# Patient Record
Sex: MALE | Race: WHITE | NOT HISPANIC OR LATINO | URBAN - METROPOLITAN AREA
[De-identification: names, ages, dates, MRNs, and addresses within clinical notes are randomized per-mention and may not be internally consistent; named-entity substitution may affect disease eponyms.]

---

## 2020-12-20 ENCOUNTER — EMERGENCY (EMERGENCY)
Facility: HOSPITAL | Age: 40
LOS: 1 days | Discharge: HOME | End: 2020-12-20
Attending: EMERGENCY MEDICINE
Payer: COMMERCIAL

## 2020-12-20 VITALS
WEIGHT: 225.09 LBS | SYSTOLIC BLOOD PRESSURE: 172 MMHG | TEMPERATURE: 98 F | DIASTOLIC BLOOD PRESSURE: 102 MMHG | RESPIRATION RATE: 18 BRPM | OXYGEN SATURATION: 100 % | HEIGHT: 73 IN | HEART RATE: 70 BPM

## 2020-12-20 LAB
ALBUMIN SERPL ELPH-MCNC: 3.9 G/DL — SIGNIFICANT CHANGE UP (ref 3.3–5.2)
ALP SERPL-CCNC: 57 U/L — SIGNIFICANT CHANGE UP (ref 40–120)
ALT FLD-CCNC: 19 U/L — SIGNIFICANT CHANGE UP
ANION GAP SERPL CALC-SCNC: 8 MMOL/L — SIGNIFICANT CHANGE UP (ref 5–17)
APTT BLD: 34.2 SEC — SIGNIFICANT CHANGE UP (ref 27.5–35.5)
AST SERPL-CCNC: 23 U/L — SIGNIFICANT CHANGE UP
BASOPHILS # BLD AUTO: 0.02 K/UL — SIGNIFICANT CHANGE UP (ref 0–0.2)
BASOPHILS NFR BLD AUTO: 0.3 % — SIGNIFICANT CHANGE UP (ref 0–2)
BILIRUB SERPL-MCNC: 0.3 MG/DL — LOW (ref 0.4–2)
BUN SERPL-MCNC: 18 MG/DL — SIGNIFICANT CHANGE UP (ref 8–20)
CALCIUM SERPL-MCNC: 9.1 MG/DL — SIGNIFICANT CHANGE UP (ref 8.6–10.2)
CHLORIDE SERPL-SCNC: 105 MMOL/L — SIGNIFICANT CHANGE UP (ref 98–107)
CO2 SERPL-SCNC: 29 MMOL/L — SIGNIFICANT CHANGE UP (ref 22–29)
CREAT SERPL-MCNC: 0.9 MG/DL — SIGNIFICANT CHANGE UP (ref 0.5–1.3)
EOSINOPHIL # BLD AUTO: 0.08 K/UL — SIGNIFICANT CHANGE UP (ref 0–0.5)
EOSINOPHIL NFR BLD AUTO: 1.1 % — SIGNIFICANT CHANGE UP (ref 0–6)
GLUCOSE SERPL-MCNC: 106 MG/DL — HIGH (ref 70–99)
HCT VFR BLD CALC: 38.6 % — LOW (ref 39–50)
HGB BLD-MCNC: 13.1 G/DL — SIGNIFICANT CHANGE UP (ref 13–17)
IMM GRANULOCYTES NFR BLD AUTO: 0.4 % — SIGNIFICANT CHANGE UP (ref 0–1.5)
INR BLD: 1.25 RATIO — HIGH (ref 0.88–1.16)
LIDOCAIN IGE QN: 49 U/L — SIGNIFICANT CHANGE UP (ref 22–51)
LYMPHOCYTES # BLD AUTO: 2.34 K/UL — SIGNIFICANT CHANGE UP (ref 1–3.3)
LYMPHOCYTES # BLD AUTO: 32.8 % — SIGNIFICANT CHANGE UP (ref 13–44)
MAGNESIUM SERPL-MCNC: 2 MG/DL — SIGNIFICANT CHANGE UP (ref 1.6–2.6)
MCHC RBC-ENTMCNC: 32.2 PG — SIGNIFICANT CHANGE UP (ref 27–34)
MCHC RBC-ENTMCNC: 33.9 GM/DL — SIGNIFICANT CHANGE UP (ref 32–36)
MCV RBC AUTO: 94.8 FL — SIGNIFICANT CHANGE UP (ref 80–100)
MONOCYTES # BLD AUTO: 1.15 K/UL — HIGH (ref 0–0.9)
MONOCYTES NFR BLD AUTO: 16.1 % — HIGH (ref 2–14)
NEUTROPHILS # BLD AUTO: 3.51 K/UL — SIGNIFICANT CHANGE UP (ref 1.8–7.4)
NEUTROPHILS NFR BLD AUTO: 49.3 % — SIGNIFICANT CHANGE UP (ref 43–77)
PLATELET # BLD AUTO: 199 K/UL — SIGNIFICANT CHANGE UP (ref 150–400)
POTASSIUM SERPL-MCNC: 4.4 MMOL/L — SIGNIFICANT CHANGE UP (ref 3.5–5.3)
POTASSIUM SERPL-SCNC: 4.4 MMOL/L — SIGNIFICANT CHANGE UP (ref 3.5–5.3)
PROT SERPL-MCNC: 6.5 G/DL — LOW (ref 6.6–8.7)
PROTHROM AB SERPL-ACNC: 14.4 SEC — HIGH (ref 10.6–13.6)
RBC # BLD: 4.07 M/UL — LOW (ref 4.2–5.8)
RBC # FLD: 12.2 % — SIGNIFICANT CHANGE UP (ref 10.3–14.5)
SODIUM SERPL-SCNC: 142 MMOL/L — SIGNIFICANT CHANGE UP (ref 135–145)
TROPONIN T SERPL-MCNC: <0.01 NG/ML — SIGNIFICANT CHANGE UP (ref 0–0.06)
WBC # BLD: 7.13 K/UL — SIGNIFICANT CHANGE UP (ref 3.8–10.5)
WBC # FLD AUTO: 7.13 K/UL — SIGNIFICANT CHANGE UP (ref 3.8–10.5)

## 2020-12-20 NOTE — ED PROVIDER NOTE - CLINICAL SUMMARY MEDICAL DECISION MAKING FREE TEXT BOX
A 40 year old male pt with a hx of Schizoaffective disorder, Bipolar I disorder, HTN, HLD, presents to the ED c/o chest tightness. A 40 year old male pt with a hx of Schizoaffective disorder, Bipolar I disorder, HTN, HLD, presents to the ED c/o chest tightness. Pt with trop negative, ekg likely early repol, but due to limited history from patient due to baseline psych issues, unable to fully assess how cardiac the chest pain was, also from paperwork pt has been non compliant with htn/hld meds, will keep in obs for serial trops/ekg and cards consult,

## 2020-12-20 NOTE — ED ADULT TRIAGE NOTE - CHIEF COMPLAINT QUOTE
From [facility] state with aide c/o chest tightness X 1 hour.   EKG done at [facility] showed "possible ST elevation to V1 to V4."  Charge RN Huy received call from [place] doctor Jamari prior to arrival to inform us pt has history of "playing opposum."  Pt arrives not answering questions, not following commands.  Pt moving arms, able to hold arm up and blinks his eyes.  Pupils equal and responsive. respirations even and unlabored, skin color normal.  [facility] administered 324mg ASA at 2130. From [facility] state with aide c/o chest tightness X 1 hour.   EKG done at [facility] showed "possible ST elevation V1 to V4."  Charge RN Huy received call from [place] doctor Jamari prior to arrival to inform us pt has history of "playing opposum."  Pt arrives not answering questions, not following commands.  Pt moving arms, able to hold arm up and blinks his eyes.  Pupils equal and responsive. respirations even and unlabored, skin color normal.  [place] administered 324mg ASA at 2130.

## 2020-12-20 NOTE — ED PROVIDER NOTE - NEUROLOGICAL, MLM
Pt refusing to open eyes or answer questions. As per report given to nurse by [facility] staff, pt likes to "play possum"

## 2020-12-20 NOTE — ED PROVIDER NOTE - PMH
Schizoaffective disorder     HLD (hyperlipidemia)    HTN (hypertension)    Schizoaffective disorder

## 2020-12-20 NOTE — ED PROVIDER NOTE - OBJECTIVE STATEMENT
A 40 year old male pt with a hx of Schizoaffective disorder, Bipolar disorder, HLD, HTN, on Depakote, Clozapine, Haldol, presents to the ED from [facility] c/o chest tightness. As per report from [facility], pt began complaining of substernal, non-radiating chest tightness 1 hour PTA. Pt was given 325 mg Aspirin PTA and was sent to ED via ambulance. As per [facility] staff, pt has hx of not being cooperative/responsive to staff in ED. In ED, pt not answering questions, staff with patient does not know his hx as he does not normally work with him. [facility] staff paperwork denies fevers/chills, ha, loc, focal neuro deficits, sob/palp, cough, abd pain/n/v/d, urinary symptoms, recent travel and sick contacts. No further hx available at this time from pt. A 40 year old male pt with a hx of Schizoaffective disorder, Bipolar disorder, HLD, HTN, on Depakote, Clozapine, Haldol, presents to the ED from [facility] c/o chest tightness. As per report from [facility], pt began complaining of substernal, non-radiating chest tightness 1 hour PTA. Pt was given 325 mg Aspirin PTA and was sent to ED via ambulance. As per [facility] staff, pt has hx of not being cooperative/responsive to staff in ED. In ED, pt not answering questions, staff with patient does not know his hx as he does not normally work with him. [facility] staff paperwork denies fevers/chills, ha, loc, focal neuro deficits, sob/palp, cough, abd pain/n/v/d, urinary symptoms, recent travel and sick contacts. No further hx available at this time from pt. Paperwork states pt is non compliant with HTN and HLD meds

## 2020-12-21 VITALS
RESPIRATION RATE: 19 BRPM | OXYGEN SATURATION: 96 % | HEART RATE: 83 BPM | DIASTOLIC BLOOD PRESSURE: 91 MMHG | TEMPERATURE: 98 F | SYSTOLIC BLOOD PRESSURE: 132 MMHG

## 2020-12-21 DIAGNOSIS — F31.9 BIPOLAR DISORDER, UNSPECIFIED: ICD-10-CM

## 2020-12-21 DIAGNOSIS — F25.9 SCHIZOAFFECTIVE DISORDER, UNSPECIFIED: ICD-10-CM

## 2020-12-21 LAB
SARS-COV-2 RNA SPEC QL NAA+PROBE: SIGNIFICANT CHANGE UP
TROPONIN T SERPL-MCNC: <0.01 NG/ML — SIGNIFICANT CHANGE UP (ref 0–0.06)
TROPONIN T SERPL-MCNC: <0.01 NG/ML — SIGNIFICANT CHANGE UP (ref 0–0.06)

## 2020-12-21 RX ORDER — METOPROLOL TARTRATE 50 MG
25 TABLET ORAL DAILY
Refills: 0 | Status: DISCONTINUED | OUTPATIENT
Start: 2020-12-21 | End: 2020-12-25

## 2020-12-21 RX ORDER — IBUPROFEN 200 MG
400 TABLET ORAL ONCE
Refills: 0 | Status: DISCONTINUED | OUTPATIENT
Start: 2020-12-21 | End: 2020-12-25

## 2020-12-21 RX ORDER — ASPIRIN/CALCIUM CARB/MAGNESIUM 324 MG
81 TABLET ORAL DAILY
Refills: 0 | Status: DISCONTINUED | OUTPATIENT
Start: 2020-12-21 | End: 2020-12-25

## 2020-12-21 RX ORDER — DIVALPROEX SODIUM 500 MG/1
250 TABLET, DELAYED RELEASE ORAL DAILY
Refills: 0 | Status: DISCONTINUED | OUTPATIENT
Start: 2020-12-21 | End: 2020-12-25

## 2020-12-21 RX ORDER — OLANZAPINE 15 MG/1
15 TABLET, FILM COATED ORAL DAILY
Refills: 0 | Status: DISCONTINUED | OUTPATIENT
Start: 2020-12-21 | End: 2020-12-25

## 2020-12-21 RX ADMIN — Medication 81 MILLIGRAM(S): at 15:08

## 2020-12-21 RX ADMIN — Medication 25 MILLIGRAM(S): at 09:38

## 2020-12-21 NOTE — ED BEHAVIORAL HEALTH ASSESSMENT NOTE - OTHER
Unable to assess Unable to assess, patient seen in bed [facility] Unable to asses chart

## 2020-12-21 NOTE — ED ADULT NURSE NOTE - NSIMPLEMENTINTERV_GEN_ALL_ED
Implemented All Fall Risk Interventions:  [place] to call system. Call bell, personal items and telephone within reach. Instruct patient to call for assistance. Room bathroom lighting operational. Non-slip footwear when patient is off stretcher. Physically safe environment: no spills, clutter or unnecessary equipment. Stretcher in lowest position, wheels locked, appropriate side rails in place. Provide visual cue, wrist band, yellow gown, etc. Monitor gait and stability. Monitor for mental status changes and reorient to person, place, and time. Review medications for side effects contributing to fall risk. Reinforce activity limits and safety measures with patient and family.

## 2020-12-21 NOTE — ED CDU PROVIDER DISPOSITION NOTE - PATIENT PORTAL LINK FT
You can access the FollowMyHealth Patient Portal offered by [facility] by registering at the following website: [email or web address]. By joining [facility]’s FollowMyHealth portal, you will also be able to view your health information using other applications (apps) compatible with our system.

## 2020-12-21 NOTE — ED ADULT NURSE NOTE - OBJECTIVE STATEMENT
Patient has been having chest tightness for 1 hour prior to arrival, patient no answer questions or responding to nurse however being cooperative and allowing tests to be done. [place] aide at bedside.

## 2020-12-21 NOTE — ED CDU PROVIDER INITIAL DAY NOTE - DETAILS
40m pw with episode of substernal chest pain x 1 hour PTA from Health system. Pt uncooperative upon arrival. Pt placed in CDU for serial trops / EKGs and SS cards consult.

## 2020-12-21 NOTE — ED CDU PROVIDER INITIAL DAY NOTE - MEDICAL DECISION MAKING DETAILS
40m pw with episode of substernal chest pain x 1 hour PTA from [facility]. Pt uncooperative upon arrival. As per [facility] staff, pt regularly refuses to cooperate during ED visits. CT head ordered to assess for acute pathology. Pt placed in CDU for serial trops / EKGs and SS cards consult.

## 2020-12-21 NOTE — ED CDU PROVIDER INITIAL DAY NOTE - OBJECTIVE STATEMENT
40 year old male pt with a hx of Schizoaffective disorder, Bipolar disorder, HLD, HTN, on Depakote, Clozapine, Haldol, presents to the ED from [facility] c/o chest tightness. As per report from [place], pt began complaining of substernal, non-radiating chest tightness 1 hour PTA. Pt was given 325 mg Aspirin PTA and was sent to ED via ambulance. As per [facility] staff, pt has hx of not being cooperative/responsive to staff in ED. In ED, pt not answering questions, staff with patient does not know his hx as he does not normally work with him. [facility] staff paperwork denies fevers/chills, ha, loc, focal neuro deficits, sob/palp, cough, abd pain/n/v/d, urinary symptoms, recent travel and sick contacts. No further hx available at this time from pt. Paperwork states pt is non compliant with HTN and HLD meds

## 2020-12-21 NOTE — ED ADULT NURSE NOTE - CHIEF COMPLAINT
Addended by: ERNESTINE ADAM on: 3/15/2018 04:38 PM     Modules accepted: Orders    
The patient is a 40y Male complaining of chest pain.

## 2020-12-21 NOTE — ED CDU PROVIDER DISPOSITION NOTE - CLINICAL COURSE
Patient Is a 40 year old male who presented from [facility] c/o chest pain. patient originally would not respond to staff, now is walking and talking with no complaints of chest pain. patient had negative serial troponins.  patient will be discharged back to [facility] with cardiology follow up

## 2020-12-21 NOTE — ED CDU PROVIDER DISPOSITION NOTE - NSFOLLOWUPINSTRUCTIONS_ED_ALL_ED_FT
- Please follow up with your Primary Care Doctor in 24-48 hr.  - Seek immediate medical care for any new, worsening or concerning signs or symptoms.   - Take medications as directed, be sure to read all instructions on packaging  - You were given copies of all your test results, please bring to your primary care doctor for review of any abnormal test results  - Follow up with Cardiology in 1 week    Feel better!

## 2020-12-21 NOTE — ED CDU PROVIDER DISPOSITION NOTE - ATTENDING CONTRIBUTION TO CARE
Placed on observation for evaluation of chest pain.  Serial ECG unremarkable, trop x3 negative.  COVID negative.

## 2020-12-21 NOTE — ED BEHAVIORAL HEALTH ASSESSMENT NOTE - DETAILS
Unable to assess, patient is non-verbal ED staff Patient non-verbal and unable to assess [facility] nursing office

## 2020-12-21 NOTE — ED CDU PROVIDER INITIAL DAY NOTE - PROGRESS NOTE DETAILS
Patient is a 40 year old male from [facility] who was seen by Dr. Alamo and myself on AM rounds. patient laying in bed, will not speak, attempted to give HTN meds which patient would not take. Spoke with psychiatry at [facility] who states patient does this when in hospital and that he was taken off BP meds.  No pedal edema, S1S2, lungs CTA.  Serial trops negative.  Will discharge back to [facility] Patient now speaking, c/o dizziness.    will give BP meds, pending covid. case d/w dr durand at [facility]: pt with h/o htn, refusing medication treatmetn and has had blood pressure in the normal ranges with last BP on 12/10 docuemted at 135/87.  Informed dr durand about chest pain work-up.  awaiting covid testing for return. patient now walking around CDU, requesting a second covid swab stating that he thinks he has now come down with covid.  Was complaining of right foot burning earlier, now ambulatory with normal gait.  Explained to patient that we are awaiting results of covid swab that was obtain at 0830 today.

## 2020-12-21 NOTE — ED BEHAVIORAL HEALTH ASSESSMENT NOTE - HPI (INCLUDE ILLNESS QUALITY, SEVERITY, DURATION, TIMING, CONTEXT, MODIFYING FACTORS, ASSOCIATED SIGNS AND SYMPTOMS)
A 39 y/o male with PMH of schizoaffective disorder, bipolar disorder, HLD and HTN. He is currently on Depakote, Clozapine and Haldol. The patient presented to the ED from [facility] c/o chest tightness, non-radiating for 1 hour. When the patient was addressed this morning he was did not respond to commands and as per ED staff he has been non-verbal since he has been in the ED. A 39 y/o male with PMH of schizoaffective disorder, , HLD and HTN. He is currently on Depakote, Clozapine and Haldol. The patient presented to the ED from [facility] c/o chest tightness, non-radiating for 1 hour. When the patient was addressed this morning he was did not respond to commands and as per ED staff he has been non-verbal since he has been in the ED.

## 2020-12-21 NOTE — CHART NOTE - NSCHARTNOTEFT_GEN_A_CORE
SOCIAL WORK NOTE:  THIS WORKER REVIEWED CASE. PATIENT IS FROM [facility] [address] WITH DX OF SCHIZOAFFECTIVE DISORDER AND BIPOLAR.  SOCIAL WORK WILL CONTINUE TO FOLLOW FOR RETURN BACK TO [facility] WHEN PATIENT IS MEDICALLY STABLE.  MD TO MD REPORT MUST BE CALLED IN PRIOR TO RETURN TO [facility] NURSING OFFICE # [phone] AND WILL FAX CLINICALS TO FAX# [phone].
SOCIAL  WORK NOTE:  COVID RESULT CAME BACK AND ARE NEGATIVE. ALL CLINCALS FAXED TO [facility] NURSING OFFICE FAX # [phone] AND AMBULANCE ARRANGED FOR ASAP PICKUP BACK TO [facility].  SPOKE WITH NURSING OFFICE AND THEY ARE AWARE.

## 2020-12-21 NOTE — ED BEHAVIORAL HEALTH ASSESSMENT NOTE - RISK ASSESSMENT
Unable to determine Suicide Risk Impaired ability to make decisions due to his regressed mental state

## 2020-12-21 NOTE — CONSULT NOTE ADULT - SUBJECTIVE AND OBJECTIVE BOX
[facility]                                                               Office:  [address] [address]                                                              Telephone: [phone]. Fax:[phone]                                                                        CARDIOLOGY CONSULTATION NOTE                                                                                             Consult requested by:    Reason for Consultation:   History obtained by: Patient and medical record   obtained: No  Cardiologist:    Chief complaint:    Patient is a 40y old  Male who presents with a chief complaint of       HPI:        REVIEW OF SYMPTOMS:     CONSTITUTIONAL: No fever, no weight loss, no fatigue, no weight gain   ENMT:  No difficulty hearing, tinnitus, vertigo; No sinus or throat pain  NECK: No pain or stiffness  CARDIOVASCULAR: No chest pain, no dyspnea, no syncope, no palpitations, no dizziness, no Orthopnea, no Paroxsymal nocturnal dyspnea  RESPIRATORY: No Dyspnea on exertion, no Shortness of breath, no cough, no wheezing  : No dysuria, no hematuria   GI: No dark color stool, no melena, no diarrhea, no constipation, no abdominal pain   NEURO: No headache, no dizziness, no slurred speech   MUSCULOSKELETAL: No joint pain or swelling; No muscle, back, or extremity pain  PSYCH: No agitation, no anxiety.    ALL OTHER REVIEW OF SYSTEMS ARE NEGATIVE.      PREVIOUS DIAGNOSTIC TESTING  ECHO FINDINGS:      STRESS FINDINGS:      CATHETERIZATION FINDINGS:         ALLERGIES: Allergies    No Known Allergies    Intolerances          PAST MEDICAL HISTORY  Schizoaffective disorder    HLD (hyperlipidemia)    HTN (hypertension)        PAST SURGICAL HISTORY      FAMILY HISTORY:  No pertinent family history in first degree relatives        SOCIAL HISTORY:  Denies smoking/alcohol/drugs  CIGARETTES:     ALCOHOL:  DRUGS:      CURRENT MEDICATIONS:  metoprolol succinate ER 25 milliGRAM(s) Oral daily     diVALproex ER  OLANZapine  aspirin enteric coated        HOME MEDICATIONS:      Vital Signs Last 24 Hrs  T(C): 36.5 (21 Dec 2020 07:56), Max: 36.6 (20 Dec 2020 22:12)  T(F): 97.7 (21 Dec 2020 07:56), Max: 97.9 (20 Dec 2020 22:12)  HR: 61 (21 Dec 2020 07:56) (61 - 74)  BP: 154/102 (21 Dec 2020 07:56) (133/92 - 172/102)  BP(mean): --  RR: 19 (21 Dec 2020 07:56) (17 - 19)  SpO2: 99% (21 Dec 2020 07:56) (97% - 100%)      PHYSICAL EXAM:  Constitutional: Comfortable . No acute distress.   HEENT: Atraumatic and normocephalic , neck is supple . no JVD. No carotid bruit. PEERL   CNS: A&Ox3. No focal deficits. EOMI. Cranial nerves II-IX are intact.   Respiratory: CTAB, ulabored   Cardiovascular: S1S2 RRR. No murmur/rubs or gallop.  Gastrointestinal: Soft non-tender and non distended . +Bowel sounds. negative Daniel's sign.  Extremities: No edema.   Psychiatric: Calm . no agitation.  Skin: No skin rash/ulcers visualized to face, hands or feet.    Intake and output:     LABS:                        13.1   7.13  )-----------( 199      ( 20 Dec 2020 23:06 )             38.6     12-20    142  |  105  |  18.0  ----------------------------<  106<H>  4.4   |  29.0  |  0.90    Ca    9.1      20 Dec 2020 23:06  Mg     2.0     12-20    TPro  6.5<L>  /  Alb  3.9  /  TBili  0.3<L>  /  DBili  x   /  AST  23  /  ALT  19  /  AlkPhos  57  12-20    CARDIAC MARKERS ( 21 Dec 2020 06:44 )  x     / <0.01 ng/mL / x     / x     / x      CARDIAC MARKERS ( 21 Dec 2020 02:46 )  x     / <0.01 ng/mL / x     / x     / x      CARDIAC MARKERS ( 20 Dec 2020 23:06 )  x     / <0.01 ng/mL / x     / x     / x          PT/INR - ( 20 Dec 2020 23:06 )   PT: 14.4 sec;   INR: 1.25 ratio         PTT - ( 20 Dec 2020 23:06 )  PTT:34.2 sec      INTERPRETATION OF TELEMETRY: Reviewed by me.   ECG: Reviewed by me.     RADIOLOGY & ADDITIONAL STUDIES:    X-ray:  reviewed by me.   CT scan:   MRI:

## 2020-12-21 NOTE — ED BEHAVIORAL HEALTH ASSESSMENT NOTE - DESCRIPTION
Patient has history of schizoaffective disorder and bipolar disorder Visit in the ED has been uneventful leading up to the assessment. The patient refuses to verbalize or obey commands throughout his stay. N/A Visit in the ED has been uneventful leading up to the assessment. The patient refuses to verbalize or obey commands throughout his stay.  `

## 2020-12-21 NOTE — ED CDU PROVIDER INITIAL DAY NOTE - ATTENDING CONTRIBUTION TO CARE
40 year old male pt with a hx of Schizoaffective disorder, Bipolar I disorder, HTN, HLD, presents to the ED c/o chest tightness. Pt with trop negative, ekg likely early repol, but due to limited history from patient due to baseline psych issues, unable to fully assess how cardiac the chest pain was, also from paperwork pt has been non compliant with htn/hld meds, will keep in obs for serial trops/ekg and cards consult,

## 2020-12-21 NOTE — ED ADULT NURSE REASSESSMENT NOTE - NS ED NURSE REASSESS COMMENT FT1
Patient restless. Walking around hallway. Refusing zyprexa po, requesting to speak with doctor prior DC. Luis Antonio Yañez at the bedside. Patient aware of transport and discharge back to [facility].
Pt continues not to answer questions EKG and Trop sent as per orders Pt on NSR on CM will continue to monitor.
Assumed care of the patient at 0730. Verbal report received from [facility] YOU[facility] Obs. Patient sleeping, not cooperating. Patient doesn't  answer any questions. Aide from [facility] present at the bedside. Per Aide patient regularly refuses to cooperate when in ED. PIV patent. NSR on CM. BP noted elevated, Luis Antonio Yañez notified, no further orders received, will continue monitoring BP. Respirations even and unlabored. Patient pending Cardiology and psych consult. Will continue to monitor.
Assumed care of the at 0320. Verbal report received from HANNAH Quijano. Pt refuses to wake up and answers questions as per HANNAH Quijano and RADHA Stark Pt has been doing this since arriving in the ED. Pt is NSR on CM as per monitor tech. Breathing is even and unlabored. VSS, Pt pending repeat trop and EKGs. plan of care explained. Will continue to monitor.

## 2020-12-21 NOTE — ED BEHAVIORAL HEALTH ASSESSMENT NOTE - CASE SUMMARY
The patient presented with chest tightness 12/20/20 from [facility]. Due to the patient's mental state he is unable to make decisions regarding his health and well-being.

## 2020-12-21 NOTE — ED BEHAVIORAL HEALTH ASSESSMENT NOTE - SUMMARY
Patient is non-verbal and unable to fully assess cognitive function. According to the ED staff the patient has been non-cooperative and non-verbal the entire time he has been here.

## 2020-12-21 NOTE — ED ADULT NURSE NOTE - CHIEF COMPLAINT QUOTE
From [facility] state with aide c/o chest tightness X 1 hour.   EKG done at [facility] showed "possible ST elevation V1 to V4."  Charge RN Huy received call from [place] doctor Jamari prior to arrival to inform us pt has history of "playing opposum."  Pt arrives not answering questions, not following commands.  Pt moving arms, able to hold arm up and blinks his eyes.  Pupils equal and responsive. respirations even and unlabored, skin color normal.  [employer] administered 324mg ASA at 2130.

## 2022-01-06 ENCOUNTER — OUTPATIENT (OUTPATIENT)
Dept: OUTPATIENT SERVICES | Facility: HOSPITAL | Age: 42
LOS: 1 days | End: 2022-01-06
Payer: COMMERCIAL

## 2022-01-06 DIAGNOSIS — Z00.8 ENCOUNTER FOR OTHER GENERAL EXAMINATION: ICD-10-CM

## 2022-01-06 PROBLEM — E78.5 HYPERLIPIDEMIA, UNSPECIFIED: Chronic | Status: ACTIVE | Noted: 2020-12-21

## 2022-01-06 PROBLEM — I10 ESSENTIAL (PRIMARY) HYPERTENSION: Chronic | Status: ACTIVE | Noted: 2020-12-21

## 2022-01-06 PROBLEM — F25.9 SCHIZOAFFECTIVE DISORDER, UNSPECIFIED: Chronic | Status: ACTIVE | Noted: 2020-12-21

## 2022-07-13 ENCOUNTER — OUTPATIENT (OUTPATIENT)
Dept: OUTPATIENT SERVICES | Facility: HOSPITAL | Age: 42
LOS: 1 days | End: 2022-07-13
Payer: COMMERCIAL

## 2022-07-13 DIAGNOSIS — F20.9 SCHIZOPHRENIA, UNSPECIFIED: ICD-10-CM

## 2023-03-31 ENCOUNTER — OUTPATIENT (OUTPATIENT)
Dept: OUTPATIENT SERVICES | Facility: HOSPITAL | Age: 43
LOS: 1 days | End: 2023-03-31
Payer: COMMERCIAL

## 2023-03-31 DIAGNOSIS — F20.9 SCHIZOPHRENIA, UNSPECIFIED: ICD-10-CM

## 2023-03-31 DIAGNOSIS — Z20.828 CONTACT WITH AND (SUSPECTED) EXPOSURE TO OTHER VIRAL COMMUNICABLE DISEASES: ICD-10-CM

## 2023-07-02 ENCOUNTER — OUTPATIENT (OUTPATIENT)
Dept: OUTPATIENT SERVICES | Facility: HOSPITAL | Age: 43
LOS: 1 days | End: 2023-07-02
Payer: COMMERCIAL

## 2023-07-02 DIAGNOSIS — Z20.822 CONTACT WITH AND (SUSPECTED) EXPOSURE TO COVID-19: ICD-10-CM
